# Patient Record
Sex: MALE | Race: WHITE | Employment: PART TIME | URBAN - METROPOLITAN AREA
[De-identification: names, ages, dates, MRNs, and addresses within clinical notes are randomized per-mention and may not be internally consistent; named-entity substitution may affect disease eponyms.]

---

## 2017-01-21 ENCOUNTER — OFFICE VISIT (OUTPATIENT)
Dept: PRIMARY CARE CLINIC | Age: 37
End: 2017-01-21

## 2017-01-21 VITALS
HEIGHT: 69 IN | WEIGHT: 251 LBS | OXYGEN SATURATION: 97 % | TEMPERATURE: 99.9 F | HEART RATE: 88 BPM | BODY MASS INDEX: 37.18 KG/M2 | RESPIRATION RATE: 22 BRPM | DIASTOLIC BLOOD PRESSURE: 86 MMHG | SYSTOLIC BLOOD PRESSURE: 146 MMHG

## 2017-01-21 DIAGNOSIS — J02.0 STREP PHARYNGITIS: Primary | ICD-10-CM

## 2017-01-21 DIAGNOSIS — R50.9 FEVER, UNSPECIFIED FEVER CAUSE: ICD-10-CM

## 2017-01-21 LAB
QUICKVUE INFLUENZA TEST: NEGATIVE
S PYO AG THROAT QL: POSITIVE
VALID INTERNAL CONTROL?: YES
VALID INTERNAL CONTROL?: YES

## 2017-01-21 RX ORDER — AMOXICILLIN 875 MG/1
875 TABLET, FILM COATED ORAL 2 TIMES DAILY
Qty: 20 TAB | Refills: 0 | Status: SHIPPED | OUTPATIENT
Start: 2017-01-21 | End: 2017-01-31

## 2017-01-21 NOTE — PROGRESS NOTES
Subjective:   Reta Davis is a 29 y.o. male who complains of sore throat, right sided neck soreness, myalgias and fever (Tmax 102.5) for 2 days, stable since that time. He denies a history of nausea, shortness of breath, vomiting, abdominal pain, wheezing and cough. He has had a flu vaccine this season. Works as a . Denies history of HTN. PCP - Dr Jairo Streeter. Evaluation to date: none. Treatment to date: OTC products. Patient does not smoke cigarettes. Relevant PMH: History reviewed. No pertinent past medical history. Past Surgical History   Procedure Laterality Date    Hx vasectomy      Hx tonsillectomy     -Multiple surgeries to L ear    No Known Allergies      Review of Systems  Pertinent items are noted in HPI. Objective:     Visit Vitals    /86 (BP 1 Location: Left arm, BP Patient Position: Sitting)    Pulse 88    Temp 99.9 °F (37.7 °C) (Oral)    Resp 22    Ht 5' 9\" (1.753 m)    Wt 251 lb (113.9 kg)    SpO2 97%    BMI 37.07 kg/m2     General:  alert, cooperative, no distress   Eyes: Erythematous and watery   Ears: normal TM's (L TM with scarring) and external ear canals AU   Sinuses: Normal paranasal sinuses without tenderness   Mouth:  Lips, mucosa, and tongue normal. Teeth and gums normal and abnormal findings: marked oropharyngeal erythema   Neck: supple, symmetrical, trachea midline and mild anterior cervical adenopathy. Heart: S1 and S2 normal, no murmurs noted. Lungs: clear to auscultation bilaterally   Abdomen: soft, non-tender. Bowel sounds normal. No masses,  no organomegaly        Results for orders placed or performed in visit on 01/21/17   AMB POC RAPID INFLUENZA TEST   Result Value Ref Range    VALID INTERNAL CONTROL POC Yes     QuickVue Influenza test Negative Negative   AMB POC RAPID STREP A   Result Value Ref Range    VALID INTERNAL CONTROL POC Yes     Group A Strep Ag Positive Negative       Assessment/Plan:       ICD-10-CM ICD-9-CM    1. Strep pharyngitis J02.0 034.0    2. Fever, unspecified fever cause R50.9 780.60 AMB POC RAPID INFLUENZA TEST      AMB POC RAPID STREP A     Discussed elevated BP w/ pt, he attributes to illness. Recheck in 1-2 weeks, f/u with PCP if remains elevated. Suggested symptomatic OTC remedies. Antibiotics per orders. RTC prn.       Amna Hoang NP

## 2017-01-21 NOTE — MR AVS SNAPSHOT
Visit Information Date & Time Provider Department Dept. Phone Encounter #  
 1/21/2017  1:15 PM Stephan Michael NP [address] [phone] [id number] Follow-up Instructions Return if symptoms worsen or fail to improve. Upcoming Health Maintenance Date Due DTaP/Tdap/Td series (1 - Tdap) 11/12/2002 INFLUENZA AGE 9 TO ADULT 8/1/2016 Allergies as of 1/21/2017  Review Complete On: 1/21/2017 By: Stephan Michael NP No Known Allergies Current Immunizations  Never Reviewed No immunizations on file. Not reviewed this visit You Were Diagnosed With   
  
 Codes Comments Fever, unspecified fever cause    -  Primary ICD-10-CM: R50.9 ICD-9-CM: 780.60 Strep pharyngitis     ICD-10-CM: J02.0 ICD-9-CM: 034.0 Vitals BP Pulse Temp Resp Height(growth percentile) Weight(growth percentile) 146/86 (BP 1 Location: Left arm, BP Patient Position: Sitting) 88 99.9 °F (37.7 °C) (Oral) 22 5' 9\" (1.753 m) 251 lb (113.9 kg) SpO2 BMI Smoking Status 97% 37.07 kg/m2 Former Smoker BMI and BSA Data Body Mass Index Body Surface Area  
 37.07 kg/m 2 2.35 m 2 Preferred Pharmacy Pharmacy Name Phone [facility] [address] [place] [address] Dr Mendieta, [address] [phone] Your Updated Medication List  
  
   
This list is accurate as of: 1/21/17  1:57 PM.  Always use your most recent med list.  
  
  
  
  
 amoxicillin 875 mg tablet Commonly known as:  AMOXIL Take 1 Tab by mouth two (2) times a day for 10 days. Prescriptions Sent to Pharmacy Refills  
 amoxicillin (AMOXIL) 875 mg tablet 0 Sig: Take 1 Tab by mouth two (2) times a day for 10 days. Class: Normal  
 Pharmacy: [address] Mendieta, [address] Ph #: [phone] Route: Oral  
  
We Performed the Following AMB POC RAPID INFLUENZA TEST [59975 CPT(R)] AMB POC RAPID STREP A [78123 CPT(R)] Follow-up Instructions Return if symptoms worsen or fail to improve. Patient Instructions Strep Throat: Care Instructions Your Care Instructions Strep throat is a bacterial infection that causes sudden, severe sore throat and fever. Strep throat, which is caused by bacteria called streptococcus, is treated with antibiotics. Sometimes a strep test is necessary to tell if the sore throat is caused by strep bacteria. Treatment can help ease symptoms and may prevent future problems. Follow-up care is a key part of your treatment and safety. Be sure to make and go to all appointments, and call your doctor if you are having problems. It's also a good idea to know your test results and keep a list of the medicines you take. How can you care for yourself at home? · Take your antibiotics as directed. Do not stop taking them just because you feel better. You need to take the full course of antibiotics. · Strep throat can spread to others until 24 hours after you begin taking antibiotics. During this time, you should avoid contact with other people at work or home, especially infants and children. Do not sneeze or cough on others, and wash your hands often. Keep your drinking glass and eating utensils separate from those of others, and wash these items well in hot, soapy water. · Gargle with warm salt water at least once each hour to help reduce swelling and make your throat feel better. Use 1 teaspoon of salt mixed in 8 fluid ounces of warm water. · Take an over-the-counter pain medication, such as acetaminophen (Tylenol), ibuprofen (Advil, Motrin), or naproxen (Aleve). Read and follow all instructions on the label. · Try an over-the-counter anesthetic throat spray or throat lozenges, which may help relieve throat pain. · Drink plenty of fluids. Fluids may help soothe an irritated throat.  Hot fluids, such as tea or soup, may help your throat feel better. · Eat soft solids and drink plenty of clear liquids. Flavored ice pops, ice cream, scrambled eggs, sherbet, and gelatin dessert (such as Jell-O) may also soothe the throat. · Get lots of rest. 
· Do not smoke, and avoid secondhand smoke. If you need help quitting, talk to your doctor about stop-smoking programs and medicines. These can increase your chances of quitting for good. · Use a vaporizer or humidifier to add moisture to the air in your bedroom. Follow the directions for cleaning the machine. When should you call for help? Call your doctor now or seek immediate medical care if: 
· You have a new or higher fever. · You have a fever with a stiff neck or severe headache. · You have new or worse trouble swallowing. · Your sore throat gets much worse on one side. · Your pain becomes much worse on one side of your throat. Watch closely for changes in your health, and be sure to contact your doctor if: 
· You are not getting better after 2 days (48 hours). · You do not get better as expected. Where can you learn more? Go to http://anuradha-elizabeth.info/. Enter K625 in the search box to learn more about \"Strep Throat: Care Instructions. \" Current as of: July 29, 2016 Content Version: 11.1 © 3255-7762 Healthwise, Incorporated. Care instructions adapted under license by [employer] (which disclaims liability or warranty for this information). If you have questions about a medical condition or this instruction, always ask your healthcare professional. [address] any warranty or liability for your use of this information. Introducing [facility]! Yumiko Tyler introduces [employer] patient portal. Now you can access parts of your medical record, email your doctor's office, and request medication refills online.    
 
1. In your internet browser, go to [email or web address] 2. Click on the First Time User? Click Here link in the Sign In box. You will see the New Member Sign Up page. 3. Enter your Pur[employer] Access Code exactly as it appears below. You will not need to use this code after youve completed the sign-up process. If you do not sign up before the expiration date, you must request a new code. · [employer] Access Code: [email or web address] Expires: 4/21/2017  1:50 PM 
 
4. Enter the last four digits of your Social Security Number (xxxx) and Date of Birth (mm/dd/yyyy) as indicated and click Submit. You will be taken to the next sign-up page. 5. Create a My Best Interestt ID. This will be your Puridify login ID and cannot be changed, so think of one that is secure and easy to remember. 6. Create a Pur[employer]ify password. You can change your password at any time. 7. Enter your Password Reset Question and Answer. This can be used at a later time if you forget your password. 8. Enter your e-mail address. You will receive e-mail notification when new information is available in [address] 9. Click Sign Up. You can now view and download portions of your medical record. 10. Click the Download Summary menu link to download a portable copy of your medical information. If you have questions, please visit the Frequently Asked Questions section of the [employer] website. Remember, Pur[employer]ify is NOT to be used for urgent needs. For medical emergencies, dial 911. Now available from your iPhone and Android! Please provide this summary of care documentation to your next provider. Your primary care clinician is listed as Jamarcus Rosales. If you have any questions after today's visit, please call [phone].

## 2017-01-21 NOTE — PATIENT INSTRUCTIONS

## 2017-01-21 NOTE — PROGRESS NOTES
Chief Complaint   Patient presents with    Fever     onset yesterday    Generalized Body Aches     x 2 days

## 2017-08-22 ENCOUNTER — HOSPITAL ENCOUNTER (OUTPATIENT)
Dept: NEUROLOGY | Age: 37
Discharge: HOME | End: 2017-08-22
Attending: EMERGENCY MEDICINE
Payer: MEDICAID

## 2017-08-22 DIAGNOSIS — R55 SYNCOPE: ICD-10-CM

## 2017-08-22 DIAGNOSIS — R55 SYNCOPE AND COLLAPSE: ICD-10-CM

## 2017-08-22 PROCEDURE — 95816 EEG AWAKE AND DROWSY: CPT

## 2017-08-30 NOTE — PROCEDURES
[address]   EEG       Name:  Karson Rothman   MR#:  [id number]   :  1980   Account #:  [de-identified]    Date of Procedure:  2017   Date of Adm:  2017       EXAM NUMBER: [id number]    DESCRIPTION OF PROCEDURE: Electrodes were applied in   accordance with International 10-20 system for electrode placement. EEG was reviewed in both bipolar and referential montages. DESCRIPTION OF FINDINGS: Background consists of symmetric 9-  10 Hz activity, which attenuated with eye opening. With photic   stimulation, there was a symmetric occipital drive. With   hyperventilation, there was slowing of the background. No seizures or   ictal hallmarks of epilepsy were noted during the study. No sleep was   noted during the study. IMPRESSION: This EEG is normal. Clinical correlation is advised.          Anthony Sewell MD      [facility].Shahid   D:  2017   10:13   T:  2017   10:32   Job #:  [id number]

## 2017-11-10 ENCOUNTER — OFFICE VISIT (OUTPATIENT)
Dept: PRIMARY CARE CLINIC | Age: 37
End: 2017-11-10

## 2017-11-10 VITALS
RESPIRATION RATE: 17 BRPM | WEIGHT: 296.4 LBS | SYSTOLIC BLOOD PRESSURE: 148 MMHG | TEMPERATURE: 97.9 F | OXYGEN SATURATION: 96 % | DIASTOLIC BLOOD PRESSURE: 87 MMHG | BODY MASS INDEX: 43.9 KG/M2 | HEART RATE: 55 BPM | HEIGHT: 69 IN

## 2017-11-10 DIAGNOSIS — R03.0 ELEVATED BLOOD PRESSURE READING WITHOUT DIAGNOSIS OF HYPERTENSION: ICD-10-CM

## 2017-11-10 DIAGNOSIS — M54.50 ACUTE MIDLINE LOW BACK PAIN WITHOUT SCIATICA: Primary | ICD-10-CM

## 2017-11-10 RX ORDER — TRAMADOL HYDROCHLORIDE 50 MG/1
50 TABLET ORAL
Qty: 10 TAB | Refills: 0 | Status: SHIPPED | OUTPATIENT
Start: 2017-11-10 | End: 2018-03-02 | Stop reason: ALTCHOICE

## 2017-11-10 RX ORDER — METHYLPREDNISOLONE 4 MG/1
TABLET ORAL
Qty: 1 DOSE PACK | Refills: 0 | Status: SHIPPED | OUTPATIENT
Start: 2017-11-10 | End: 2018-03-02 | Stop reason: ALTCHOICE

## 2017-11-10 RX ORDER — NAPROXEN 500 MG/1
500 TABLET ORAL 2 TIMES DAILY WITH MEALS
Qty: 15 TAB | Refills: 0 | Status: SHIPPED | OUTPATIENT
Start: 2017-11-10 | End: 2018-03-02 | Stop reason: ALTCHOICE

## 2017-11-10 NOTE — PATIENT INSTRUCTIONS
Back Stretches: Exercises  Your Care Instructions  Here are some examples of exercises for stretching your back. Start each exercise slowly. Ease off the exercise if you start to have pain. Your doctor or physical therapist will tell you when you can start these exercises and which ones will work best for you. How to do the exercises  Overhead stretch    1. Stand comfortably with your feet shoulder-width apart. 2. Looking straight ahead, raise both arms over your head and reach toward the ceiling. Do not allow your head to tilt back. 3. Hold for 15 to 30 seconds, then lower your arms to your sides. 4. Repeat 2 to 4 times. Side stretch    1. Stand comfortably with your feet shoulder-width apart. 2. Raise one arm over your head, and then lean to the other side. 3. Slide your hand down your leg as you let the weight of your arm gently stretch your side muscles. Hold for 15 to 30 seconds. 4. Repeat 2 to 4 times on each side. Press-up    1. Lie on your stomach, supporting your body with your forearms. 2. Press your elbows down into the floor to raise your upper back. As you do this, relax your stomach muscles and allow your back to arch without using your back muscles. As your press up, do not let your hips or pelvis come off the floor. 3. Hold for 15 to 30 seconds, then relax. 4. Repeat 2 to 4 times. Relax and rest    1. Lie on your back with a rolled towel under your neck and a pillow under your knees. Extend your arms comfortably to your sides. 2. Relax and breathe normally. 3. Remain in this position for about 10 minutes. 4. If you can, do this 2 or 3 times each day. Follow-up care is a key part of your treatment and safety. Be sure to make and go to all appointments, and call your doctor if you are having problems. It's also a good idea to know your test results and keep a list of the medicines you take. Where can you learn more? Go to http://anuradha-elizabeth.info/.   Enter K122 in the search box to learn more about \"Back Stretches: Exercises. \"  Current as of: March 21, 2017  Content Version: 11.4  © 7825-0306 [employer]. Care instructions adapted under license by [employer] (which disclaims liability or warranty for this information). If you have questions about a medical condition or this instruction, always ask your healthcare professional. [address] any warranty or liability for your use of this information. Back Care and Preventing Injuries: Care Instructions  Your Care Instructions  You can hurt your back doing many everyday activities: lifting a heavy box, bending down to garden, exercising at the gym, and even getting out of bed. But you can keep your back strong and healthy by doing some exercises. You also can follow a few tips for sitting, sleeping, and lifting to avoid hurting your back again. Talk to your doctor before you start an exercise program. Ask for help if you want to learn more about keeping your back healthy. Follow-up care is a key part of your treatment and safety. Be sure to make and go to all appointments, and call your doctor if you are having problems. It's also a good idea to know your test results and keep a list of the medicines you take. How can you care for yourself at home? · Stay at a healthy weight to avoid strain on your lower back. · Do not smoke. Smoking increases the risk of osteoporosis, which weakens the spine. If you need help quitting, talk to your doctor about stop-smoking programs and medicines. These can increase your chances of quitting for good. · Make sure you sleep in a position that maintains your back's normal curves and on a mattress that feels comfortable. Sleep on your side with a pillow between your knees, or sleep on your back with a pillow under your knees. These positions can reduce strain on your back.   · When you get out of bed, lie on your side and bend both knees. Drop your feet over the edge of the bed as you push up with both arms. Scoot to the edge of the bed. Make sure your feet are in line with your rear end (buttocks), and then stand up. · If you must stand for a long time, put one foot on a stool, ledge, or box. Exercise to strengthen your back and other muscles  · Get at least 30 minutes of exercise on most days of the week. Walking is a good choice. You also may want to do other activities, such as running, swimming, cycling, or playing tennis or team sports. · Stretch your back muscles. Here are few exercises to try:  [place] on your back with your knees bent and your feet flat on the floor. Gently pull one bent knee to your chest. Put that foot back on the floor, and then pull the other knee to your chest. Hold for 15 to 30 seconds. Repeat 2 to 4 times. ¨ Do pelvic tilts. Lie on your back with your knees bent. Tighten your stomach muscles. Pull your belly button (navel) in and up toward your ribs. You should feel like your back is pressing to the floor and your hips and pelvis are slightly lifting off the floor. Hold for 6 seconds while breathing smoothly. · Keep your core muscles strong. The muscles of your back, belly (abdomen), and buttocks support your spine. ¨ Pull in your belly, and imagine pulling your navel toward your spine. Hold this for 6 seconds, then relax. Remember to keep breathing normally as you tense your muscles. ¨ Do curl-ups. Always do them with your knees bent. Keep your low back on the floor, and curl your shoulders toward your knees using a smooth, slow motion. Keep your arms folded across your chest. If this bothers your neck, try putting your hands behind your neck (not your head), with your elbows spread apart. ¨ Lie on your back with your knees bent and your feet flat on the floor. Tighten your belly muscles, and then push with your feet and raise your buttocks up a few inches.  Hold this position 6 seconds as you continue to breathe normally, then lower yourself slowly to the floor. Repeat 8 to 12 times. ¨ If you like group exercise, try Pilates or yoga. These classes have poses that strengthen the core muscles. Protect your back when you sit  · Place a small pillow, a rolled-up towel, or a lumbar roll in the curve of your back if you need extra support. · Sit in a chair that is low enough to let you place both feet flat on the floor with both knees nearly level with your hips. If your chair or desk is too high, use a foot rest to raise your knees. · When driving, keep your knees nearly level with your hips. Sit straight, and drive with both hands on the steering wheel. Your arms should be in a slightly bent position. · Try a kneeling chair, which helps tilt your hips forward. This takes pressure off your lower back. · Try sitting on an exercise ball. It can rock from side to side, which helps keep your back loose. Lift properly  · Squat down, bending at the hips and knees only. If you need to, put one knee to the floor and extend your other knee in front of you, bent at a right angle (half kneeling). · Press your chest straight forward. This helps keep your upper back straight while keeping a slight arch in your low back. · Hold the load as close to your body as possible, at the level of your navel. · Use your feet to change direction, taking small steps. · Lead with your hips as you change direction. Keep your shoulders in line with your hips as you move. Do not twist your body. · Set down your load carefully, squatting with your knees and hips only. When should you call for help? Watch closely for changes in your health, and be sure to contact your doctor if you have any problems. Where can you learn more? Go to http://anuradha-elizabeth.info/. Enter S810 in the search box to learn more about \"Back Care and Preventing Injuries: Care Instructions. \"  Current as of: March 21, 2017  Content Version: 11.4  © 7247-7485 Healthwise, Incorporated. Care instructions adapted under license by [employer] (which disclaims liability or warranty for this information). If you have questions about a medical condition or this instruction, always ask your healthcare professional. [place] [address] any warranty or liability for your use of this information.

## 2017-11-10 NOTE — PROGRESS NOTES
Chief Complaint   Patient presents with    Back Pain   pt c/o lower back pain, states Tuesday he was lifting weights and felt a pain in his lower back, additionally he states the same night when he went to turn over he experienced worsening pain, states he has been taking ibuprofen to help with discomfort This note will not be viewable in MyChart.

## 2017-11-10 NOTE — MR AVS SNAPSHOT
Visit Information Date & Time Provider Department Dept. Phone Encounter #  
 11/10/2017 10:00  Gi Gregory, [facility]  Upcoming Health Maintenance Date Due DTaP/Tdap/Td series (1 - Tdap) 11/12/2002 Allergies as of 11/10/2017  Review Complete On: 11/10/2017 By: Yao Gregory MD  
 No Known Allergies Current Immunizations  Never Reviewed Name Date Influenza Vaccine 9/13/2017 Not reviewed this visit You Were Diagnosed With   
  
 Codes Comments Acute midline low back pain without sciatica    -  Primary ICD-10-CM: M54.5 ICD-9-CM: 724.2 Elevated blood pressure reading without diagnosis of hypertension     ICD-10-CM: R03.0 ICD-9-CM: 796.2 Vitals BP Pulse Temp Resp Height(growth percentile) Weight(growth percentile) 148/87 (BP 1 Location: Left arm, BP Patient Position: Sitting) (!) 55 97.9 °F (36.6 °C) (Oral) 17 5' 9\" (1.753 m) 296 lb 6.4 oz (134.4 kg) SpO2 BMI Smoking Status 96% 43.77 kg/m2 Former Smoker BMI and BSA Data Body Mass Index Body Surface Area 43.77 kg/m 2 2.56 m 2 Preferred Pharmacy Pharmacy Name Phone [address] [place] [address], [address] [place] [address] Antonia Kessler Said [phone] Your Updated Medication List  
  
   
This list is accurate as of: 11/10/17 10:21 AM.  Always use your most recent med list.  
  
  
  
  
 methylPREDNISolone 4 mg tablet Commonly known as:  Christiano Bandar Follow pack instructions  
  
 naproxen 500 mg tablet Commonly known as:  NAPROSYN Take 1 Tab by mouth two (2) times daily (with meals). traMADol 50 mg tablet Commonly known as:  ULTRAM  
Take 1 Tab by mouth nightly as needed for Pain. Max Daily Amount: 50 mg.  
  
  
  
  
Prescriptions Printed Refills  
 traMADol (ULTRAM) 50 mg tablet 0 Sig: Take 1 Tab by mouth nightly as needed for Pain. Max Daily Amount: 50 mg. Class: Print Route: Oral  
  
Prescriptions Sent to Pharmacy Refills  
 methylPREDNISolone (MEDROL DOSEPACK) 4 mg tablet 0 Sig: Follow pack instructions Class: Normal  
 Pharmacy: [facility] at [address] Ph #: [phone]  
 naproxen (NAPROSYN) 500 mg tablet 0 Sig: Take 1 Tab by mouth two (2) times daily (with meals). Class: Normal  
 Pharmacy: [facility] at [address] Ph #: [phone] Route: Oral  
  
Patient Instructions Back Stretches: Exercises Your Care Instructions Here are some examples of exercises for stretching your back. Start each exercise slowly. Ease off the exercise if you start to have pain. Your doctor or physical therapist will tell you when you can start these exercises and which ones will work best for you. How to do the exercises Overhead stretch 1. Stand comfortably with your feet shoulder-width apart. 2. Looking straight ahead, raise both arms over your head and reach toward the ceiling. Do not allow your head to tilt back. 3. Hold for 15 to 30 seconds, then lower your arms to your sides. 4. Repeat 2 to 4 times. Side stretch 1. Stand comfortably with your feet shoulder-width apart. 2. Raise one arm over your head, and then lean to the other side. 3. Slide your hand down your leg as you let the weight of your arm gently stretch your side muscles. Hold for 15 to 30 seconds. 4. Repeat 2 to 4 times on each side. Press-up 1. Lie on your stomach, supporting your body with your forearms. 2. Press your elbows down into the floor to raise your upper back. As you do this, relax your stomach muscles and allow your back to arch without using your back muscles. As your press up, do not let your hips or pelvis come off the floor. 3. Hold for 15 to 30 seconds, then relax. 4. Repeat 2 to 4 times.  
Relax and rest 
 
 1. Lie on your back with a rolled towel under your neck and a pillow under your knees. Extend your arms comfortably to your sides. 2. Relax and breathe normally. 3. Remain in this position for about 10 minutes. 4. If you can, do this 2 or 3 times each day. Follow-up care is a key part of your treatment and safety. Be sure to make and go to all appointments, and call your doctor if you are having problems. It's also a good idea to know your test results and keep a list of the medicines you take. Where can you learn more? Go to http://anuradhaKinesio Captureelizabeth.info/. Enter K924 in the search box to learn more about \"Back Stretches: Exercises. \" Current as of: March 21, 2017 Content Version: 11.4 © 7007-1956 [employer]. Care instructions adapted under license by [employer] (which disclaims liability or warranty for this information). If you have questions about a medical condition or this instruction, always ask your healthcare professional. [place] [address] any warranty or liability for your use of this information. Back Care and Preventing Injuries: Care Instructions Your Care Instructions You can hurt your back doing many everyday activities: lifting a heavy box, bending down to garden, exercising at the gym, and even getting out of bed. But you can keep your back strong and healthy by doing some exercises. You also can follow a few tips for sitting, sleeping, and lifting to avoid hurting your back again. Talk to your doctor before you start an exercise program. Ask for help if you want to learn more about keeping your back healthy. Follow-up care is a key part of your treatment and safety. Be sure to make and go to all appointments, and call your doctor if you are having problems. It's also a good idea to know your test results and keep a list of the medicines you take. How can you care for yourself at home? · Stay at a healthy weight to avoid strain on your lower back. · Do not smoke. Smoking increases the risk of osteoporosis, which weakens the spine. If you need help quitting, talk to your doctor about stop-smoking programs and medicines. These can increase your chances of quitting for good. · Make sure you sleep in a position that maintains your back's normal curves and on a mattress that feels comfortable. Sleep on your side with a pillow between your knees, or sleep on your back with a pillow under your knees. These positions can reduce strain on your back. · When you get out of bed, lie on your side and bend both knees. Drop your feet over the edge of the bed as you push up with both arms. Scoot to the edge of the bed. Make sure your feet are in line with your rear end (buttocks), and then stand up. · If you must stand for a long time, put one foot on a stool, ledge, or box. Exercise to strengthen your back and other muscles · Get at least 30 minutes of exercise on most days of the week. Walking is a good choice. You also may want to do other activities, such as running, swimming, cycling, or playing tennis or team sports. · Stretch your back muscles. Here are few exercises to try: ¨ Lie on your back with your knees bent and your feet flat on the floor. Gently pull one bent knee to your chest. Put that foot back on the floor, and then pull the other knee to your chest. Hold for 15 to 30 seconds. Repeat 2 to 4 times. ¨ Do pelvic tilts. Lie on your back with your knees bent. Tighten your stomach muscles. Pull your belly button (navel) in and up toward your ribs. You should feel like your back is pressing to the floor and your hips and pelvis are slightly lifting off the floor. Hold for 6 seconds while breathing smoothly. · Keep your core muscles strong. The muscles of your back, belly (abdomen), and buttocks support your spine. ¨ Pull in your belly, and imagine pulling your navel toward your spine. Hold this for 6 seconds, then relax. Remember to keep breathing normally as you tense your muscles. ¨ Do curl-ups. Always do them with your knees bent. Keep your low back on the floor, and curl your shoulders toward your knees using a smooth, slow motion. Keep your arms folded across your chest. If this bothers your neck, try putting your hands behind your neck (not your head), with your elbows spread apart. ¨ Lie on your back with your knees bent and your feet flat on the floor. Tighten your belly muscles, and then push with your feet and raise your buttocks up a few inches. Hold this position 6 seconds as you continue to breathe normally, then lower yourself slowly to the floor. Repeat 8 to 12 times. ¨ If you like group exercise, try Pilates or yoga. These classes have poses that strengthen the core muscles. Protect your back when you sit · Place a small pillow, a rolled-up towel, or a lumbar roll in the curve of your back if you need extra support. · Sit in a chair that is low enough to let you place both feet flat on the floor with both knees nearly level with your hips. If your chair or desk is too high, use a foot rest to raise your knees. · When driving, keep your knees nearly level with your hips. Sit straight, and drive with both hands on the steering wheel. Your arms should be in a slightly bent position. · Try a kneeling chair, which helps tilt your hips forward. This takes pressure off your lower back. · Try sitting on an exercise ball. It can rock from side to side, which helps keep your back loose. Lift properly · Squat down, bending at the hips and knees only. If you need to, put one knee to the floor and extend your other knee in front of you, bent at a right angle (half kneeling). · Press your chest straight forward. This helps keep your upper back straight while keeping a slight arch in your low back. · Hold the load as close to your body as possible, at the level of your navel. · Use your feet to change direction, taking small steps. · Lead with your hips as you change direction. Keep your shoulders in line with your hips as you move. Do not twist your body. · Set down your load carefully, squatting with your knees and hips only. When should you call for help? Watch closely for changes in your health, and be sure to contact your doctor if you have any problems. Where can you learn more? Go to http://anuradha-elizabeth.info/. Enter S810 in the search box to learn more about \"Back Care and Preventing Injuries: Care Instructions. \" Current as of: March 21, 2017 Content Version: 11.4 © 4851-1677 [employer]. Care instructions adapted under license by [employer] (which disclaims liability or warranty for this information). If you have questions about a medical condition or this instruction, always ask your healthcare professional. [address] any warranty or liability for your use of this information. Introducing \[facility]\"" & HEALTH SERVICES! Robin Hampton introduces "[employer] patient portal. Now you can access parts of your medical record, email your doctor's office, and request medication refills online. 1. In your internet browser, go to [email or web address] 2. Click on the First Time User? Click Here link in the Sign In box. You will see the New Member Sign Up page. 3. Enter your "[employer] Access Code exactly as it appears below. You will not need to use this code after youve completed the sign-up process. If you do not sign up before the expiration date, you must request a new code. · "[employer] Access Code: [email or web address] Expires: 11/19/2017 10:59 AM 
 
4. Enter the last four digits of your Social Security Number (xxxx) and Date of Birth (mm/dd/yyyy) as indicated and click Submit. You will be taken to the next sign-up page. 5. Create a "[employer] ID.  This will be your "[employer] login ID and cannot be changed, so think of one that is secure and easy to remember. 6. Create a [employer] password. You can change your password at any time. 7. Enter your Password Reset Question and Answer. This can be used at a later time if you forget your password. 8. Enter your e-mail address. You will receive e-mail notification when new information is available in [address] 9. Click Sign Up. You can now view and download portions of your medical record. 10. Click the Download Summary menu link to download a portable copy of your medical information. If you have questions, please visit the Frequently Asked Questions section of the [employer] website. Remember, [employer] is NOT to be used for urgent needs. For medical emergencies, dial 911. Now available from your iPhone and Android! Please provide this summary of care documentation to your next provider. Your primary care clinician is listed as Cesar Whitman. If you have any questions after today's visit, please call [phone].

## 2017-11-10 NOTE — PROGRESS NOTES
Subjective:   History: This patient is a 29 y.o. male with a chief complaint of back pain. Started 2 days ago while he was doing barbell squats in the gym, thinks he squatted >90 degrees, felt a sharp pain in his lower back but was able to finish his sets. Pain restarted that night when he was getting in bed. Pain in midline of lower back, no radiation, experienced with bending over, extending back to lay down. Pain currently 5/10. He has tried ibuprofen 800 mg BID, warm compresses, bengay patch with no relief. No hx of back injuries. He is requesting a medrol dose pack since it has worked for his wife in the past.   He is worried about his BP. States it is usually in 140s/80s at home and at his annual physicals. No bowel or bladder incontinence. No fever, night sweats, or weight changes. No saddle anesthesia. Review of Systems:  General/Constitutional:  No fever, chills, sweats, fatigue, night sweats, weakness, weight loss or weight gain   Head: No headache, no trauma   Neck: No swelling, masses, stiffness, pain, or limited movement   Cardiac: No chest pain   Respiratory: No cough, shortness of breath, or dyspnea on exertion   GI: No incontinence. No nausea/vomiting, diarrhea, abdominal pain, bloody or dark stools  : No incontinence. No change in urinary habits. Peripheral Vascular: No edema, coldness, numbness, discoloration, pain, or paresthesias   Musculoskeletal: As per HPI  Neurological: No saddle distribution paresthesia. No siatic pain. No loss of consciousness, dizziness, seizures, dysarthria, cognitive changes, problems with balance, or unilateral weakness. History reviewed. No pertinent past medical history.   Family History   Problem Relation Age of Onset    No Known Problems Mother     No Known Problems Father        No Known Allergies  Social History     Social History    Marital status:      Spouse name: N/A    Number of children: N/A    Years of education: N/A     Occupational History    Not on file. Social History Main Topics    Smoking status: Former Smoker     Quit date: 6/2/2015    Smokeless tobacco: Never Used    Alcohol use Yes      Comment: occasionally    Drug use: No    Sexual activity: Not on file     Other Topics Concern    Not on file     Social History Narrative       Objective:     Visit Vitals    /87 (BP 1 Location: Left arm, BP Patient Position: Sitting)    Pulse (!) 55    Temp 97.9 °F (36.6 °C) (Oral)    Resp 17    Ht 5' 9\" (1.753 m)    Wt 296 lb 6.4 oz (134.4 kg)    SpO2 96%    BMI 43.77 kg/m2       General: Alert and oriented and in no acute distress. Responds to all questions appropriately. LUNGS: Respirations unlabored. Skin: No obvious rash. MSK:    Posture: Normal   Deformity: None    ROM:     Flexion: limited due to pain    Extension: limited due to pain     Lateral bending: Normal      Gait: Normal       Palpation:    L1-L5: mild ttp    Sacrum: mild ttp     Coccyx: No tenderness    Left Paraspinal: No tenderness    Right Paraspinal: No tenderness     Strength (0-5/5)    Hip Flexion:   Left: 5/5  Right: 5/5    Hip Extension:  Left: 5/5  Right: 5/5    Hip Abduction:  Left: 5/5  Right: 5/5    Hip Adduction:  Left: 5/5  Right: 5/5    Knee Extension:  Left: 5/5  Right: 5/5    Knee Flexion:   Left: 5/5  Right: 5/5    Ankle dorsiflexion:  Left: 5/5  Right: 5/5    Ankle plantarflexion:  Left: 5/5  Right: 5/5    Great toe extension:  Left: 5/5  Right: 5/5     Sensation: Intact, no deficits      DTR:    Patella:  Left: +2  Right: +2     Special test:    Straight leg: Left: Negative  Right: Negative       Assessment:       ICD-10-CM ICD-9-CM    1. Acute midline low back pain without sciatica M54.5 724.2 traMADol (ULTRAM) 50 mg tablet      methylPREDNISolone (MEDROL DOSEPACK) 4 mg tablet      naproxen (NAPROSYN) 500 mg tablet   2.  Elevated blood pressure reading without diagnosis of hypertension R03.0 796.2      Likely MSK, patient insisted on getting medrol pack as his birthday is this weekend and does not want to be in so much pain. Discussed realistic expectations in terms of pain control after a muscle injury and lack of sciatic pain distribution but patient adamant. meds per orders, warm compresses, stretches per handout. Return PRN. BP slightly elevated likely due to pain. Advised to continue to monitor at home and follow up with PCP to decide about starting meds. Plan:   1. Home Exercise Program as per handout. 2. Ice 15 minutes, three times a day PRN and after exercise. Can alternate with heat for 15 minutes. Medications:    1. Naproxin (Aleve): 220mg 1-2 tablets twice a day PRN. 2. Acetaminophen (Tylenol):  500mg 1-2 tablets every 6 hours as needed for pain. This note will not be viewable in [address]

## 2018-03-02 ENCOUNTER — OFFICE VISIT (OUTPATIENT)
Dept: PRIMARY CARE CLINIC | Age: 38
End: 2018-03-02

## 2018-03-02 VITALS
RESPIRATION RATE: 17 BRPM | HEIGHT: 69 IN | TEMPERATURE: 100.1 F | OXYGEN SATURATION: 96 % | BODY MASS INDEX: 37.33 KG/M2 | DIASTOLIC BLOOD PRESSURE: 100 MMHG | SYSTOLIC BLOOD PRESSURE: 143 MMHG | WEIGHT: 252 LBS | HEART RATE: 106 BPM

## 2018-03-02 DIAGNOSIS — J10.1 INFLUENZA B: Primary | ICD-10-CM

## 2018-03-02 DIAGNOSIS — R68.89 FLU-LIKE SYMPTOMS: ICD-10-CM

## 2018-03-02 LAB
QUICKVUE INFLUENZA TEST: POSITIVE
S PYO AG THROAT QL: NEGATIVE
VALID INTERNAL CONTROL?: YES
VALID INTERNAL CONTROL?: YES

## 2018-03-02 NOTE — MR AVS SNAPSHOT
Manav Garcia 
 
 
 [address] [place] [address] 
[phone] Patient: Norah Haney MRN: [id number] :1980 Visit Information Date & Time Provider Department Dept. Phone Encounter #  
 3/2/2018  2:45 PM Yao Gregory, [facility] [phone] Upcoming Health Maintenance Date Due DTaP/Tdap/Td series (1 - Tdap) 2002 Allergies as of 3/2/2018  Review Complete On: 3/2/2018 By: Yao Gregory MD  
 No Known Allergies Current Immunizations  Never Reviewed Name Date Influenza Vaccine 2017 Not reviewed this visit You Were Diagnosed With   
  
 Codes Comments Influenza B    -  Primary ICD-10-CM: J10.1 ICD-9-CM: 487.7 Flu-like symptoms     ICD-10-CM: R68.89 ICD-9-CM: 780.99 Vitals BP Pulse Temp Resp Height(growth percentile) Weight(growth percentile) (!) 143/100 (BP 1 Location: Left arm, BP Patient Position: Sitting) (!) 106 100.1 °F (37.8 °C) (Oral) 17 5' 9\" (1.753 m) 252 lb (114.3 kg) SpO2 BMI Smoking Status 96% 37.21 kg/m2 Former Smoker Vitals History BMI and BSA Data Body Mass Index Body Surface Area  
 37.21 kg/m 2 2.36 m 2 Preferred Pharmacy Pharmacy Name Phone [address] [place] [address] Antonia Kessler Said [phone] Your Updated Medication List  
  
Notice  As of 3/2/2018  3:04 PM  
 You have not been prescribed any medications. We Performed the Following AMB POC RAPID INFLUENZA TEST [60205 CPT(R)] AMB POC RAPID STREP A [65698 CPT(R)] Patient Instructions Influenza (Flu): Care Instructions Your Care Instructions Influenza (flu) is an infection in the lungs and breathing passages. It is caused by the influenza virus.  There are different strains, or types, of the flu virus from year to year. Unlike the common cold, the flu comes on suddenly and the symptoms, such as a cough, congestion, fever, chills, fatigue, aches, and pains, are more severe. These symptoms may last up to 10 days. Although the flu can make you feel very sick, it usually doesn't cause serious health problems. Home treatment is usually all you need for flu symptoms. But your doctor may prescribe antiviral medicine to prevent other health problems, such as pneumonia, from developing. Older people and those who have a long-term health condition, such as lung disease, are most at risk for having pneumonia or other health problems. Follow-up care is a key part of your treatment and safety. Be sure to make and go to all appointments, and call your doctor if you are having problems. It's also a good idea to know your test results and keep a list of the medicines you take. How can you care for yourself at home? · Get plenty of rest. 
· Drink plenty of fluids, enough so that your urine is light yellow or clear like water. If you have kidney, heart, or liver disease and have to limit fluids, talk with your doctor before you increase the amount of fluids you drink. · Take an over-the-counter pain medicine if needed, such as acetaminophen (Tylenol), ibuprofen (Advil, Motrin), or naproxen (Aleve), to relieve fever, headache, and muscle aches. Read and follow all instructions on the label. No one younger than 20 should take aspirin. It has been linked to Reye syndrome, a serious illness. · Do not smoke. Smoking can make the flu worse. If you need help quitting, talk to your doctor about stop-smoking programs and medicines. These can increase your chances of quitting for good. · Breathe moist air from a hot shower or from a sink filled with hot water to help clear a stuffy nose. · Before you use cough and cold medicines, check the label.  These medicines may not be safe for young children or for people with certain health problems. · If the skin around your nose and lips becomes sore, put some petroleum jelly on the area. · To ease coughing: ¨ Drink fluids to soothe a scratchy throat. ¨ Suck on cough drops or plain hard candy. ¨ Take an over-the-counter cough medicine that contains dextromethorphan to help you get some sleep. Read and follow all instructions on the label. ¨ Raise your head at night with an extra pillow. This may help you rest if coughing keeps you awake. · Take any prescribed medicine exactly as directed. Call your doctor if you think you are having a problem with your medicine. To avoid spreading the flu · Wash your hands regularly, and keep your hands away from your face. · Stay home from school, work, and other public places until you are feeling better and your fever has been gone for at least 24 hours. The fever needs to have gone away on its own without the help of medicine. · Ask people living with you to talk to their doctors about preventing the flu. They may get antiviral medicine to keep from getting the flu from you. · To prevent the flu in the future, get a flu vaccine every fall. Encourage people living with you to get the vaccine. · Cover your mouth when you cough or sneeze. When should you call for help? Call 911 anytime you think you may need emergency care. For example, call if: 
? · You have severe trouble breathing. ?Call your doctor now or seek immediate medical care if: 
? · You have new or worse trouble breathing. ? · You seem to be getting much sicker. ? · You feel very sleepy or confused. ? · You have a new or higher fever. ? · You get a new rash. ? Watch closely for changes in your health, and be sure to contact your doctor if: 
? · You begin to get better and then get worse. ? · You are not getting better after 1 week. Where can you learn more? Go to http://anuradha-elizabeth.info/. Enter E878 in the search box to learn more about \"Influenza (Flu): Care Instructions. \" Current as of: May 12, 2017 Content Version: 11.4 © 3174-8529 [employer]. Care instructions adapted under license by [employer] (which disclaims liability or warranty for this information). If you have questions about a medical condition or this instruction, always ask your healthcare professional. [address] any warranty or liability for your use of this information. Introducing \[facility]\"" & HEALTH SERVICES! Michelle Kelly introduces [employer] patient portal. Now you can access parts of your medical record, email your doctor's office, and request medication refills online. 1. In your internet browser, go to [email or web address] 2. Click on the First Time User? Click Here link in the Sign In box. You will see the New Member Sign Up page. 3. Enter your [employer] Access Code exactly as it appears below. You will not need to use this code after youve completed the sign-up process. If you do not sign up before the expiration date, you must request a new code. · [employer] Access Code: [email or web address] Expires: 5/31/2018  3:04 PM 
 
4. Enter the last four digits of your Social Security Number (xxxx) and Date of Birth (mm/dd/yyyy) as indicated and click Submit. You will be taken to the next sign-up page. 5. Create a [employer] ID. This will be your [employer] login ID and cannot be changed, so think of one that is secure and easy to remember. 6. Create a [employer] password. You can change your password at any time. 7. Enter your Password Reset Question and Answer. This can be used at a later time if you forget your password. 8. Enter your e-mail address. You will receive e-mail notification when new information is available in [address] 9. Click Sign Up. You can now view and download portions of your medical record. 10. Click the Download Summary menu link to download a portable copy of your medical information. If you have questions, please visit the Frequently Asked Questions section of the [employer] website. Remember, [employer] is NOT to be used for urgent needs. For medical emergencies, dial 911. Now available from your iPhone and Android! Please provide this summary of care documentation to your next provider. Your primary care clinician is listed as Sonja Rubinstein. If you have any questions after today's visit, please call [phone].

## 2018-03-02 NOTE — PROGRESS NOTES
Sheri Powell is a 40 y.o. male who presents for nasal congestion, sore throat, fever x2 days. Associated with cough non-productive. He has tried allergy meds. Today temp 101.4, took ibuprofen. His daughter had influenza 2 weeks ago. History reviewed. No pertinent past medical history. Past Surgical History:   Procedure Laterality Date    HX TONSILLECTOMY      HX VASECTOMY         Meds:   No current outpatient prescriptions on file prior to visit. No current facility-administered medications on file prior to visit. Allergies:   No Known Allergies    Smoker:  History   Smoking Status    Former Smoker    Quit date: 6/2/2015   Smokeless Tobacco    Never Used       ETOH:   History   Alcohol Use    Yes     Comment: occasionally       FH:   Family History   Problem Relation Age of Onset    No Known Problems Mother     No Known Problems Father        ROS:  General/Constitutional:   No weight loss or weight gain       Eyes:   No redness, pruritis, pain, visual changes, swelling, or discharge      Ears:    No pain, loss or changes in hearing     Nose: Nasal congestion and rhinorrea  Neck:   No swelling, masses, stiffness, pain, or limited movement     Cardiac:    No chest pain      Respiratory:  cough   GI:   No nausea/vomiting, diarrhea, abdominal pain, bloody or dark stools       Skin: No rash     Physical Exam:  Visit Vitals    BP (!) 143/100 (BP 1 Location: Left arm, BP Patient Position: Sitting)    Pulse (!) 106    Temp 100.1 °F (37.8 °C) (Oral)    Resp 17    Ht 5' 9\" (1.753 m)    Wt 252 lb (114.3 kg)    SpO2 96%    BMI 37.21 kg/m2     General: Alert and oriented, mild ill appearnce. Responds to all questions appropriately. SKIN: No rash. Normal color. HEAD: No sinus tenderness. EYES: Conjunctiva are clear; pupils round and reactive to light. EARS: External normal, canals clear, tympanic membranes normal.  NOSE: Edema, erythema, clear mucous drainage.              OROPHARYNX: Slight tonsil edema, erythema, no exudate. NECK: Supple; no masses; normal lymphadenopathy. LUNGS: Respirations unlabored; clear to auscultation bilaterally, no wheeze, rales or rhonchi. CARDIOVASCULAR: Regular, rate, and rhythm without murmurs, gallops or rubs. EXTREMITIES: No edema, cyanosis or clubbing. NEUROLOGIC: Speech intact; face symmetrical; moves all extremities equally    Rapid strep negative. Rapid influenza B positive. Assessment:    ICD-10-CM ICD-9-CM    1. Influenza B J10.1 487.1    2. Flu-like symptoms R68.89 780.99 AMB POC RAPID STREP A      AMB POC RAPID INFLUENZA TEST     Discussed benefit/risks of starting tamiflu, he is otherwise healthy with no major comorbidities so decided not to start tamiflu. OTCs as below. Return PRN. ED warnings given. Plan:  Discharge instructions:  1. Combination cough and cold medicine such as Mucinex DM  2. Salt water gargle. 3. Plenty of fluids. 4. Ibuprofen (Motrin, Advil):  200mg - take 1-4 tables three times as needed for pain and fever   5. Acetaminophen (Tylenol):  500mg 1-2 tablets every 6 hours as needed for pain and fever. 6. Throat lozenges such as Halls as needed. 7. Humidifier as needed. Follow Up:  Get re-examined if not improved in  5-7 days or if symptoms worsen. If you get suddenly worse, go to the nearest hospital Emergency Room    This note will not be viewable in MyChart.

## 2018-03-02 NOTE — PATIENT INSTRUCTIONS

## 2018-03-15 ENCOUNTER — OFFICE VISIT (OUTPATIENT)
Dept: PRIMARY CARE CLINIC | Age: 38
End: 2018-03-15

## 2018-03-15 VITALS
TEMPERATURE: 97.9 F | SYSTOLIC BLOOD PRESSURE: 144 MMHG | BODY MASS INDEX: 36.58 KG/M2 | WEIGHT: 247 LBS | HEART RATE: 79 BPM | RESPIRATION RATE: 16 BRPM | DIASTOLIC BLOOD PRESSURE: 92 MMHG | OXYGEN SATURATION: 96 % | HEIGHT: 69 IN

## 2018-03-15 DIAGNOSIS — J20.9 ACUTE BRONCHITIS, UNSPECIFIED ORGANISM: Primary | ICD-10-CM

## 2018-03-15 DIAGNOSIS — R07.81 RIB PAIN ON RIGHT SIDE: ICD-10-CM

## 2018-03-15 DIAGNOSIS — J04.0 ACUTE LARYNGITIS: ICD-10-CM

## 2018-03-15 DIAGNOSIS — S20.211D CHEST WALL CONTUSION, RIGHT, SUBSEQUENT ENCOUNTER: ICD-10-CM

## 2018-03-15 RX ORDER — PREDNISONE 20 MG/1
40 TABLET ORAL
Qty: 10 TAB | Refills: 0 | Status: SHIPPED | OUTPATIENT
Start: 2018-03-15 | End: 2018-03-20

## 2018-03-15 RX ORDER — FLUTICASONE PROPIONATE 50 MCG
2 SPRAY, SUSPENSION (ML) NASAL DAILY
COMMUNITY
End: 2019-09-16 | Stop reason: ALTCHOICE

## 2018-03-15 NOTE — PROGRESS NOTES
HISTORY OF PRESENT ILLNESS  Presley Young is a 40 y.o. male. HPI  Presents for continued rib pain after fall 2 weeks ago and chest tightness & cough and bloody nasal mucus x1 week. He was diagnosed with influenza B on 3/2. He had the fall on his right mid back 2 days prior to last visit, bruising has gone but  and unable to sleep on right side. He has been blowing bloody nasal discharge but no spontaneous bleeding, worse today after cutting grass. He also reports voice hoarseness but no sore throat. No fever or chills. He has started his wife's old rx of levaquin 500 mg x4 days. He has been taking mucinex, sudafed, benadryl, ibuprofen. He is a former smoker. Review of Systems   Constitutional: Negative for chills and fever. HENT: Positive for congestion and nosebleeds. Negative for ear pain, sinus pain and sore throat. Eyes: Negative for blurred vision, double vision, photophobia and pain. Respiratory: Positive for cough. Negative for shortness of breath and stridor. Cardiovascular: Positive for chest pain (chest wall pain as in HPI). Gastrointestinal: Negative for abdominal pain, diarrhea, nausea and vomiting. Skin: Negative for rash. Neurological: Negative for tingling, sensory change, focal weakness and headaches. History reviewed. No pertinent past medical history.   Past Surgical History:   Procedure Laterality Date    HX TONSILLECTOMY      HX VASECTOMY       Social History     Social History    Marital status:      Spouse name: N/A    Number of children: N/A    Years of education: N/A     Social History Main Topics    Smoking status: Former Smoker     Quit date: 6/2/2015    Smokeless tobacco: Never Used    Alcohol use Yes      Comment: occasionally    Drug use: No    Sexual activity: Not Asked     Other Topics Concern    None     Social History Narrative     Family History   Problem Relation Age of Onset    No Known Problems Mother     No Known Problems Father      No current outpatient prescriptions on file prior to visit. No current facility-administered medications on file prior to visit. No Known Allergies    Physical Exam   Constitutional: He is oriented to person, place, and time. He appears well-developed and well-nourished. No distress. BP (!) 144/92 (BP 1 Location: Left arm, BP Patient Position: Sitting)  Pulse 79  Temp 97.9 °F (36.6 °C) (Oral)   Resp 16  Ht 5' 9\" (1.753 m)  Wt 247 lb (112 kg)  SpO2 96%  BMI 36.48 kg/m2   HENT:   Head: Normocephalic and atraumatic. Right Ear: Tympanic membrane normal.   Left Ear: Tympanic membrane normal.   Nose: Mucosal edema present. No epistaxis (no bleeding ulcers seen in anterior nose). Right sinus exhibits no maxillary sinus tenderness and no frontal sinus tenderness. Left sinus exhibits no maxillary sinus tenderness and no frontal sinus tenderness. Mouth/Throat: Oropharynx is clear and moist.   Eyes: Conjunctivae and EOM are normal. Pupils are equal, round, and reactive to light. No scleral icterus. Neck: Normal range of motion. Neck supple. Cardiovascular: Normal rate, regular rhythm and normal heart sounds. No murmur heard. Pulmonary/Chest: Effort normal and breath sounds normal. No stridor. No respiratory distress. He has no decreased breath sounds. He has no wheezes. He has no rhonchi. He has no rales. He exhibits bony tenderness (tenderness to palpation over 9th & 10th ribs, no crepitus or deformity or ecchymosis ). He exhibits no crepitus and no deformity. Abdominal: Soft. Bowel sounds are normal. He exhibits no distension. There is no tenderness. There is no rebound. Musculoskeletal: Normal range of motion. Neurological: He is alert and oriented to person, place, and time. No cranial nerve deficit. Skin: No rash noted. Psychiatric: He has a normal mood and affect. His behavior is normal.   Nursing note and vitals reviewed.     Rib & CXR: no rib fracture, pneumothorax or pleural effusion    ASSESSMENT and PLAN    ICD-10-CM ICD-9-CM    1. Acute bronchitis, unspecified organism J20.9 466.0 predniSONE (DELTASONE) 20 mg tablet   2. Rib pain on right side R07.81 786.50 fluticasone (FLONASE ALLERGY RELIEF) 50 mcg/actuation nasal spray      XR RIBS RT W PA CXR MIN 3 V   3. Acute laryngitis J04.0 464.00    4. Chest wall contusion, right, subsequent encounter S20.211D V58.89      922.1      No fracture or evidence of pna or pneumothorax on CXR. Cough likely acute bronchitis, start prednisone, he self medicated with old levaquin rx, advised to stop. No bleeding seen in anterior nose, though patient reporting bloody nasal discharge. Advised to use decongestant, saline rinses, follow up with ENT if persistent bleeding occurs. Discharge instructions:  1. Combination cough and could medicine such as Mucinex DM  2. Salt water gargle. 3. Plenty of fluids. 4. Ibuprofen (Motrin, Advil):  200mg - take 1-4 tables three times as needed for pain and fever   -OR-    Naproxin (Aleve):  220mg 1-2 tablets twice as needed for pain and fever  5. Acetaminophen (Tylenol):  500mg 1-2 tablets every 6 hours as needed for pain and fever. 6. Throat lozenges such as Halls as needed. 7. Humidifier as needed. Follow Up:  Get re-examined if not improved in  5-7 days or if symptoms worsen. If you get suddenly worse, go to the nearest hospital Emergency Room      This note will not be viewable in [employer].

## 2018-03-15 NOTE — PATIENT INSTRUCTIONS
Laryngitis: Care Instructions  Your Care Instructions    Laryngitis is an inflammation of the voice box (larynx) that causes your voice to become raspy or hoarse. It can be short-lived or long-lasting. Most of the time, laryngitis comes on quickly and lasts as long as 2 weeks. It is caused by overuse, irritation, or infection of the vocal cords inside the larynx. Some of the most common causes are a cold, the flu, or allergies. Loud talking, shouting, cheering, or singing also can cause laryngitis. Stomach acid that backs up into the throat also can make you lose your voice. Resting your voice and taking other steps at home can help you get your voice back. Follow-up care is a key part of your treatment and safety. Be sure to make and go to all appointments, and call your doctor if you are having problems. It's also a good idea to know your test results and keep a list of the medicines you take. How can you care for yourself at home? · Follow your doctor's directions for treating the condition that caused you to lose your voice. If your doctor prescribed antibiotics, take them as directed. Do not stop taking them just because you feel better. You need to take the full course of antibiotics. · Before you use cough and cold medicines, check the label. They may not be safe for young children or for people with certain health problems. · Try to keep stomach acid from backing up into your throat. Do not eat just before bedtime. Reduce the amount of coffee and alcohol you drink, and eat healthy foods. Taking over-the-counter acid reducers can help when these steps are not enough. In some cases, you may need prescription medicine. · Rest your voice. You do not have to stop speaking, but use your voice as little as possible. Speak softly but do not whisper; whispering can bother your larynx more than speaking softly. Avoid talking on the telephone or trying to speak loudly. · Try not to clear your throat.  This can cause more irritation of your larynx. Take an over-the-counter cough suppressant (if your doctor recommends it) if you have a dry cough that does not produce mucus. · Do not smoke or allow others to smoke around you. If you need help quitting, talk to your doctor about stop-smoking programs and medicines. These can increase your chances of quitting for good. · Use a humidifier or vaporizer to add moisture to your bedroom. Humidity helps to thin the mucus in the nasal membranes that causes stuffiness or postnasal drip. Follow the directions for cleaning the machine. · Drink plenty of fluids, enough so that your urine is light yellow or clear like water. If you have kidney, heart, or liver disease and have to limit fluids, talk with your doctor before you increase the amount of fluids you drink. · Use saline (saltwater) nasal washes to help keep your nasal passages open and wash out mucus and bacteria. You can buy saline nose drops at a grocery store or drugstore. Or, you can make your own at home by mixing ½ teaspoon salt, 1 cup water (at room temperature), and ½ teaspoon baking soda. If you make your own, fill a bulb syringe with the solution, insert the tip into your nostril, and squeeze gently. Yvette Solian your nose. When should you call for help? Call 911 anytime you think you may need emergency care. For example, call if:  ? · You have trouble breathing. ?Call your doctor now or seek immediate medical care if:  ? · You have new or worse pain. ? · You have trouble swallowing. ? Watch closely for changes in your health, and be sure to contact your doctor if:  ? · You do not get better as expected. Where can you learn more? Go to http://anuradha-elizabeth.info/. Enter F008 in the search box to learn more about \"Laryngitis: Care Instructions. \"  Current as of: May 12, 2017  Content Version: 11.4  © 3197-9520 [employer].  Care instructions adapted under license by [employer] (which disclaims liability or warranty for this information). If you have questions about a medical condition or this instruction, always ask your healthcare professional. [address] any warranty or liability for your use of this information.

## 2018-03-15 NOTE — PROGRESS NOTES
Pt states that he had the flu two weeks ago. Pt states that he fell on his porch about 1 week before he got the flu and thinks he bruised some ribs. Pt states that he was cutting grass today and was sniffling. Pt states that when he blows his nose, blood comes up. Pt states that he is taking his wife's leftover antibiotics (Levaquin) for the past 4 days. Pt states that his resting heart rate has been elevated.

## 2018-03-15 NOTE — MR AVS SNAPSHOT
Romana Halo 
 
 
 [address] [place] [address] 
[phone] Patient: Conchis Perea MRN: [id number] :1980 Visit Information Date & Time Provider Department Dept. Phone Encounter #  
 3/15/2018  4:30 PM [facility] Vandana [id number] Upcoming Health Maintenance Date Due DTaP/Tdap/Td series (1 - Tdap) 2002 Allergies as of 3/15/2018  Review Complete On: 3/15/2018 By: Yao Gregory MD  
 No Known Allergies Current Immunizations  Never Reviewed Name Date Influenza Vaccine 2017 Not reviewed this visit You Were Diagnosed With   
  
 Codes Comments Acute bronchitis, unspecified organism    -  Primary ICD-10-CM: J20.9 ICD-9-CM: 466.0 Rib pain on right side     ICD-10-CM: R07.81 ICD-9-CM: 786.50 Acute laryngitis     ICD-10-CM: J04.0 ICD-9-CM: 464.00 Vitals BP Pulse Temp Resp Height(growth percentile) Weight(growth percentile) (!) 144/92 (BP 1 Location: Left arm, BP Patient Position: Sitting) 79 97.9 °F (36.6 °C) (Oral) 16 5' 9\" (1.753 m) 247 lb (112 kg) SpO2 BMI Smoking Status 96% 36.48 kg/m2 Former Smoker Vitals History BMI and BSA Data Body Mass Index Body Surface Area  
 36.48 kg/m 2 2.34 m 2 Preferred Pharmacy Pharmacy Name Phone [facility] [address] [place] [address] [phone] Your Updated Medication List  
  
   
This list is accurate as of 3/15/18  5:28 PM.  Always use your most recent med list.  
  
  
  
  
 FLONASE ALLERGY RELIEF 50 mcg/actuation nasal spray Generic drug:  fluticasone 2 Sprays by Both Nostrils route daily. predniSONE 20 mg tablet Commonly known as:  Bhavesh Kerns Take 2 Tabs by mouth daily (with breakfast) for 5 days. Prescriptions Sent to Pharmacy  Refills  
 predniSONE (DELTASONE) 20 mg tablet 0  
 Sig: Take 2 Tabs by mouth daily (with breakfast) for 5 days. Class: Normal  
 Pharmacy: [facility] DR CARLYLE CLEMONS [address], [address] [facility] #: [phone] Route: Oral  
  
To-Do List   
 03/15/2018 Imaging:  XR RIBS RT W PA CXR MIN 3 V Referral Information Referral ID Referred By Referred To  
  
 [id number] Bhavana Galan Not Available Visits Status Start Date End Date 1 New Request 3/15/18 3/15/19 If your referral has a status of pending review or denied, additional information will be sent to support the outcome of this decision. Patient Instructions Laryngitis: Care Instructions Your Care Instructions Laryngitis is an inflammation of the voice box (larynx) that causes your voice to become raspy or hoarse. It can be short-lived or long-lasting. Most of the time, laryngitis comes on quickly and lasts as long as 2 weeks. It is caused by overuse, irritation, or infection of the vocal cords inside the larynx. Some of the most common causes are a cold, the flu, or allergies. Loud talking, shouting, cheering, or singing also can cause laryngitis. Stomach acid that backs up into the throat also can make you lose your voice. Resting your voice and taking other steps at home can help you get your voice back. Follow-up care is a key part of your treatment and safety. Be sure to make and go to all appointments, and call your doctor if you are having problems. It's also a good idea to know your test results and keep a list of the medicines you take. How can you care for yourself at home? · Follow your doctor's directions for treating the condition that caused you to lose your voice. If your doctor prescribed antibiotics, take them as directed. Do not stop taking them just because you feel better. You need to take the full course of antibiotics. · Before you use cough and cold medicines, check the label.  They may not be safe for young children or for people with certain health problems. · Try to keep stomach acid from backing up into your throat. Do not eat just before bedtime. Reduce the amount of coffee and alcohol you drink, and eat healthy foods. Taking over-the-counter acid reducers can help when these steps are not enough. In some cases, you may need prescription medicine. · Rest your voice. You do not have to stop speaking, but use your voice as little as possible. Speak softly but do not whisper; whispering can bother your larynx more than speaking softly. Avoid talking on the telephone or trying to speak loudly. · Try not to clear your throat. This can cause more irritation of your larynx. Take an over-the-counter cough suppressant (if your doctor recommends it) if you have a dry cough that does not produce mucus. · Do not smoke or allow others to smoke around you. If you need help quitting, talk to your doctor about stop-smoking programs and medicines. These can increase your chances of quitting for good. · Use a humidifier or vaporizer to add moisture to your bedroom. Humidity helps to thin the mucus in the nasal membranes that causes stuffiness or postnasal drip. Follow the directions for cleaning the machine. · Drink plenty of fluids, enough so that your urine is light yellow or clear like water. If you have kidney, heart, or liver disease and have to limit fluids, talk with your doctor before you increase the amount of fluids you drink. · Use saline (saltwater) nasal washes to help keep your nasal passages open and wash out mucus and bacteria. You can buy saline nose drops at a grocery store or drugstore. Or, you can make your own at home by mixing ½ teaspoon salt, 1 cup water (at room temperature), and ½ teaspoon baking soda. If you make your own, fill a bulb syringe with the solution, insert the tip into your nostril, and squeeze gently. Juana Antionette your nose. When should you call for help? Call 911 anytime you think you may need emergency care. For example, call if: 
? · You have trouble breathing. ?Call your doctor now or seek immediate medical care if: 
? · You have new or worse pain. ? · You have trouble swallowing. ? Watch closely for changes in your health, and be sure to contact your doctor if: 
? · You do not get better as expected. Where can you learn more? Go to http://anuradha-elizabeth.info/. Enter Y126 in the search box to learn more about \"Laryngitis: Care Instructions. \" Current as of: May 12, 2017 Content Version: 11.4 © 6786-7885 [employer]. Care instructions adapted under license by [employer] (which disclaims liability or warranty for this information). If you have questions about a medical condition or this instruction, always ask your healthcare professional. [address] any warranty or liability for your use of this information. Introducing [facility]! Elias Ortiz introduces Forkforce patient portal. Now you can access parts of your medical record, email your doctor's office, and request medication refills online. 1. In your internet browser, go to [email or web address] 2. Click on the First Time User? Click Here link in the Sign In box. You will see the New Member Sign Up page. 3. Enter your Forkforce Access Code exactly as it appears below. You will not need to use this code after youve completed the sign-up process. If you do not sign up before the expiration date, you must request a new code. · Forkforce Access Code: [email or web address] Expires: 5/31/2018  4:04 PM 
 
4. Enter the last four digits of your Social Security Number (xxxx) and Date of Birth (mm/dd/yyyy) as indicated and click Submit. You will be taken to the next sign-up page. 5. Create a Forkforce ID.  This will be your Forkforce login ID and cannot be changed, so think of one that is secure and easy to remember. 6. Create a PresenterNet password. You can change your password at any time. 7. Enter your Password Reset Question and Answer. This can be used at a later time if you forget your password. 8. Enter your e-mail address. You will receive e-mail notification when new information is available in [address] 9. Click Sign Up. You can now view and download portions of your medical record. 10. Click the Download Summary menu link to download a portable copy of your medical information. If you have questions, please visit the Frequently Asked Questions section of the PresenterNet website. Remember, PresenterNet is NOT to be used for urgent needs. For medical emergencies, dial 911. Now available from your iPhone and Android! Please provide this summary of care documentation to your next provider. Your primary care clinician is listed as Gurjit [place]. If you have any questions after today's visit, please call [phone].

## 2018-11-19 ENCOUNTER — OFFICE VISIT (OUTPATIENT)
Dept: PRIMARY CARE CLINIC | Age: 38
End: 2018-11-19

## 2018-11-19 VITALS
HEART RATE: 55 BPM | OXYGEN SATURATION: 97 % | BODY MASS INDEX: 38.21 KG/M2 | RESPIRATION RATE: 20 BRPM | SYSTOLIC BLOOD PRESSURE: 122 MMHG | WEIGHT: 258 LBS | HEIGHT: 69 IN | TEMPERATURE: 97.6 F | DIASTOLIC BLOOD PRESSURE: 84 MMHG

## 2018-11-19 DIAGNOSIS — J02.9 SORE THROAT: ICD-10-CM

## 2018-11-19 DIAGNOSIS — K12.2 UVULITIS: Primary | ICD-10-CM

## 2018-11-19 LAB
S PYO AG THROAT QL: NEGATIVE
VALID INTERNAL CONTROL?: YES

## 2018-11-19 RX ORDER — IBUPROFEN 200 MG
800 TABLET ORAL AS NEEDED
COMMUNITY
End: 2019-09-16 | Stop reason: ALTCHOICE

## 2018-11-19 RX ORDER — CEFDINIR 300 MG/1
300 CAPSULE ORAL 2 TIMES DAILY
Qty: 14 CAP | Refills: 0 | Status: SHIPPED | OUTPATIENT
Start: 2018-11-19 | End: 2018-11-26

## 2018-11-19 RX ORDER — VITAMIN E CAP 100 UNIT 100 UNIT
CAP ORAL DAILY
COMMUNITY
End: 2019-09-16 | Stop reason: ALTCHOICE

## 2018-11-19 RX ORDER — PREDNISONE 20 MG/1
40 TABLET ORAL DAILY
Qty: 10 TAB | Refills: 0 | Status: SHIPPED | OUTPATIENT
Start: 2018-11-19 | End: 2018-11-24

## 2018-11-19 RX ORDER — BISMUTH SUBSALICYLATE 262 MG
1 TABLET,CHEWABLE ORAL DAILY
COMMUNITY

## 2018-11-19 NOTE — PATIENT INSTRUCTIONS
Sore Throat: Care Instructions  Your Care Instructions    Infection by bacteria or a virus causes most sore throats. Cigarette smoke, dry air, air pollution, allergies, and yelling can also cause a sore throat. Sore throats can be painful and annoying. Fortunately, most sore throats go away on their own. If you have a bacterial infection, your doctor may prescribe antibiotics. Follow-up care is a key part of your treatment and safety. Be sure to make and go to all appointments, and call your doctor if you are having problems. It's also a good idea to know your test results and keep a list of the medicines you take. How can you care for yourself at home? · If your doctor prescribed antibiotics, take them as directed. Do not stop taking them just because you feel better. You need to take the full course of antibiotics. · Gargle with warm salt water once an hour to help reduce swelling and relieve discomfort. Use 1 teaspoon of salt mixed in 1 cup of warm water. · Take an over-the-counter pain medicine, such as acetaminophen (Tylenol), ibuprofen (Advil, Motrin), or naproxen (Aleve). Read and follow all instructions on the label. · Be careful when taking over-the-counter cold or flu medicines and Tylenol at the same time. Many of these medicines have acetaminophen, which is Tylenol. Read the labels to make sure that you are not taking more than the recommended dose. Too much acetaminophen (Tylenol) can be harmful. · Drink plenty of fluids. Fluids may help soothe an irritated throat. Hot fluids, such as tea or soup, may help decrease throat pain. · Use over-the-counter throat lozenges to soothe pain. Regular cough drops or hard candy may also help. These should not be given to young children because of the risk of choking. · Do not smoke or allow others to smoke around you. If you need help quitting, talk to your doctor about stop-smoking programs and medicines.  These can increase your chances of quitting for good. · Use a vaporizer or humidifier to add moisture to your bedroom. Follow the directions for cleaning the machine. When should you call for help? Call your doctor now or seek immediate medical care if:    · You have new or worse trouble swallowing.     · Your sore throat gets much worse on one side.    Watch closely for changes in your health, and be sure to contact your doctor if you do not get better as expected. Where can you learn more? Go to http://anuradha-elizabeth.info/. Enter 062 [id number] in the search box to learn more about \"Sore Throat: Care Instructions. \"  Current as of: March 28, 2018  Content Version: 11.8  © 1455-3320 [employer]. Care instructions adapted under license by [employer] (which disclaims liability or warranty for this information). If you have questions about a medical condition or this instruction, always ask your healthcare professional. [place] [address] any warranty or liability for your use of this information. Uvulitis: Care Instructions  Your Care Instructions    Uvulitis (say \"ppi-ltve-BZ-tus\") is an inflammation of the uvula (say \"MIO-ndms-mpi\"). This is the small piece of finger-shaped tissue that hangs down in the back of the throat. Uvulitis is most often caused by an infection. It can also be a reaction to an allergy or injury. Often the cause is not known. Your uvula may be red and swollen. You may feel like something is stuck at the back of your throat. Sometimes you may have a hard time swallowing. You may have a sore throat or a fever. Home treatment for a sore throat may be all that is needed to relieve uvulitis. If it is caused by an infection, your doctor may give you an antibiotic. Your doctor may prescribe an antihistamine or a steroid medicine if uvulitis is caused by an allergy. It may also go away without treatment. Follow-up care is a key part of your treatment and safety.  Be sure to make and go to all appointments, and call your doctor if you are having problems. It's also a good idea to know your test results and keep a list of the medicines you take. How can you care for yourself at home? · Be safe with medicines. Take your medicines exactly as prescribed. Call your doctor if you think you are having a problem with your medicine. You will get more details on the specific medicines your doctor prescribes. · Gargle with warm salt water once an hour to help reduce swelling and relieve pain. Use 1 teaspoon of salt mixed in 1 cup of warm water. · Try an over-the-counter throat spray to relieve throat pain. · Ask your doctor if you can take an over-the-counter pain medicine, such as acetaminophen (Tylenol), ibuprofen (Advil, Motrin), or naproxen (Aleve). Read and follow all instructions on the label. · Drink plenty of fluids. Fluids may help soothe your throat. If you have kidney, heart, or liver disease and have to limit fluids, talk with your doctor before you increase the amount of fluids you drink. · Do not smoke or allow others to smoke around you. Smoking can make your throat problem worse. If you need help quitting, talk to your doctor about stop-smoking programs and medicines. These can increase your chances of quitting for good. When should you call for help? Call your doctor now or seek immediate medical care if:    · You have new or worse symptoms of infection, such as:  ? Increased pain, swelling, warmth, or redness. ? Red streaks leading from the area. ? Pus draining from the area. ? A fever.     · You have new pain, or your pain gets worse.     · You have new or worse trouble swallowing.     · You seem to be getting sicker.     · You have shortness of breath.    Watch closely for changes in your health, and be sure to contact your doctor if:    · You do not get better as expected. Where can you learn more? Go to http://anuradha-elizabeth.info/.   Enter Y118 in the search box to learn more about \"Uvulitis: Care Instructions. \"  Current as of: March 28, 2018  Content Version: 11.8  © 5715-4868 [employer]. Care instructions adapted under license by [employer] (which disclaims liability or warranty for this information). If you have questions about a medical condition or this instruction, always ask your healthcare professional. [place] [address] any warranty or liability for your use of this information.

## 2018-11-19 NOTE — PROGRESS NOTES
Subjective:   Mira Moore is a 41 y.o. male who complains of sore throat, swollen glands, pain while swallowing, enlarged tonsils and swelling in uvula  for 1 days. He denies a history of shortness of breath and wheezing. He states he has the feeling that his throat is swelling and it is very painful, and his uvula is causing him to gag because it is swollen and touching the back of tongue and throat. Patient does not smoke cigarettes. Relevant PMH: No pertinent additional PMH. Objective:      Visit Vitals  /84 (BP 1 Location: Right arm, BP Patient Position: Sitting)   Pulse (!) 55   Temp 97.6 °F (36.4 °C) (Oral)   Resp 20   Ht 5' 9\" (1.753 m)   Wt 258 lb (117 kg)   SpO2 97%   BMI 38.10 kg/m²      Appears anxious and in mild to moderate distress. Ears: bilateral TM's and external ear canals normal  Oropharynx: erythematous, white/ yellow exudate noted and uvula moderately to severly edematous,   Neck: bilateral symmetric anterior adenopathy  Lungs: clear to auscultation, no wheezes, rales or rhonchi, symmetric air entry  The abdomen is soft without tenderness or hepatosplenomegaly. Rapid Strep test is negative    Assessment/Plan:   uvulitis  Per orders. Gargle, use acetaminophen or other OTC analgesic, and take Rx fully as prescribed. Sip cold fluids, suck on ice, use cool mist humidifyer and present to the ED if Call if other family members develop similar symptoms. See prn. ICD-10-CM ICD-9-CM    1. Uvulitis K12.2 528. 3 predniSONE (DELTASONE) 20 mg tablet   2. Sore throat J02.9 462 AMB POC RAPID STREP A      cefdinir (OMNICEF) 300 mg capsule   .

## 2019-09-13 ENCOUNTER — OFFICE VISIT (OUTPATIENT)
Dept: PRIMARY CARE CLINIC | Age: 39
End: 2019-09-13

## 2019-09-13 VITALS
TEMPERATURE: 98.4 F | HEART RATE: 59 BPM | WEIGHT: 259.8 LBS | BODY MASS INDEX: 38.48 KG/M2 | DIASTOLIC BLOOD PRESSURE: 74 MMHG | OXYGEN SATURATION: 94 % | HEIGHT: 69 IN | RESPIRATION RATE: 16 BRPM | SYSTOLIC BLOOD PRESSURE: 135 MMHG

## 2019-09-13 DIAGNOSIS — J06.9 VIRAL URI: Primary | ICD-10-CM

## 2019-09-13 RX ORDER — METHYLPREDNISOLONE 4 MG/1
TABLET ORAL
Refills: 0 | COMMUNITY
Start: 2019-06-06 | End: 2019-09-16 | Stop reason: ALTCHOICE

## 2019-09-13 RX ORDER — OXYCODONE HYDROCHLORIDE 5 MG/1
TABLET ORAL
Refills: 0 | COMMUNITY
Start: 2019-07-01 | End: 2019-09-16 | Stop reason: ALTCHOICE

## 2019-09-13 RX ORDER — CEPHALEXIN 500 MG/1
CAPSULE ORAL
Refills: 0 | COMMUNITY
Start: 2019-07-01 | End: 2019-09-16 | Stop reason: ALTCHOICE

## 2019-09-13 RX ORDER — LEVOCETIRIZINE DIHYDROCHLORIDE 5 MG/1
5 TABLET, FILM COATED ORAL DAILY
Qty: 30 TAB | Refills: 0 | Status: SHIPPED | OUTPATIENT
Start: 2019-09-13 | End: 2019-10-13

## 2019-09-13 NOTE — PROGRESS NOTES
Nubia Alicia is a 41 y.o. male    Room:4    Chief Complaint   Patient presents with    Sore Throat     pt states he is having a sore throat, started yesterday and worse this am. has taken ibuprofen. Visit Vitals  /74 (BP 1 Location: Left arm, BP Patient Position: Sitting)   Pulse (!) 59   Temp 98.4 °F (36.9 °C) (Oral)   Resp 16   Ht 5' 9\" (1.753 m)   Wt 259 lb 12.8 oz (117.8 kg)   SpO2 94%   BMI 38.37 kg/m²       Pain Scale: 0 - No pain/10    1. Have you been to the ER, urgent care clinic since your last visit? Hospitalized since your last visit? No    2. Have you seen or consulted any other health care providers outside of the [address] [place] [address] since your last visit? Include any pap smears or colon screening.  No        Had a leg procedure on July 1st.

## 2019-09-13 NOTE — PATIENT INSTRUCTIONS

## 2019-09-16 NOTE — PROGRESS NOTES
Subjective:   Nicholas Johnston is a 41 y.o. male who complains of congestion for 2 days, stable since that time. He denies a history of anorexia, chest pain, chills, dizziness, fatigue, fevers, myalgias, nausea, shortness of breath, sweats, vomiting, weakness, weight loss, wheezing, cough and sputum production. Evaluation to date: none. Treatment to date: OTC products. Patient does not smoke cigarettes. Relevant PMH: No pertinent additional PMH. There is no problem list on file for this patient. There are no active problems to display for this patient. Current Outpatient Medications   Medication Sig Dispense Refill    levocetirizine (XYZAL) 5 mg tablet Take 1 Tab by mouth daily for 30 days. 30 Tab 0    multivitamin (ONE A DAY) tablet Take 1 Tab by mouth daily. No Known Allergies  No past medical history on file. Past Surgical History:   Procedure Laterality Date    HX TONSILLECTOMY      HX VASECTOMY       Family History   Problem Relation Age of Onset    No Known Problems Mother     No Known Problems Father      Social History     Tobacco Use    Smoking status: Former Smoker     Last attempt to quit: 2015     Years since quittin.2    Smokeless tobacco: Never Used   Substance Use Topics    Alcohol use: Yes     Comment: occasionally        Review of Systems  Pertinent items are noted in HPI. Objective: There were no vitals taken for this visit. General:  alert, cooperative, no distress   Eyes: conjunctivae/corneas clear. PERRL, EOM's intact. Fundi benign   Ears: normal TM's and external ear canals AU   Sinuses: Normal paranasal sinuses without tenderness   Mouth:  Lips, mucosa, and tongue normal. Teeth and gums normal   Neck: supple, symmetrical, trachea midline and no adenopathy. Heart: S1 and S2 normal, no murmurs noted. Lungs: clear to auscultation bilaterally   Abdomen: soft, non-tender.  Bowel sounds normal. No masses,  no organomegaly Assessment/Plan:   viral upper respiratory illness  Discussed dx and tx of URIs  Suggested symptomatic OTC remedies. RTC prn. Discussed diagnosis and treatment of viral URIs. Discussed the importance of avoiding unnecessary antibiotic therapy. ICD-10-CM ICD-9-CM    1. Viral URI J06.9 465.9 levocetirizine (XYZAL) 5 mg tablet     reviewed medications and side effects in detail. There were no vitals taken for this visit. Spoke with the patient regarding their blood pressure (BP) reading at today's visit. The patient verbalized understanding of need to maintain BP lower than 140/90. The patient will follow up with their primary care physician regarding management and/or medications that may be needed. Drepssion Screening has been completed. The patient isdenies having a history of depression. The patient is nottaking medication and is being followed by their PCP at this time. This patient does  have a primary care physician. Referral was not given a referral at todays visit. Immunizations: The patient is current on their influenza immunization at this time. The patient does not   want to receive the influenza immunization today. Follow up instructions given at today's visit were verbalized by the patient/parent. The signs of infection are fever > 100.4, increase fatigue, change in mental status, or decrease in urinary output. The patient/parent verbalized understanding of taking medications prescribed during this visit as prescribed.

## 2019-09-24 ENCOUNTER — TELEPHONE (OUTPATIENT)
Dept: PRIMARY CARE CLINIC | Age: 39
End: 2019-09-24

## 2019-09-24 NOTE — TELEPHONE ENCOUNTER
I have no idea why patient is calling. I don't see any phone messages left by nurse or any test results that were ordered.